# Patient Record
Sex: MALE | Race: BLACK OR AFRICAN AMERICAN
[De-identification: names, ages, dates, MRNs, and addresses within clinical notes are randomized per-mention and may not be internally consistent; named-entity substitution may affect disease eponyms.]

---

## 2019-07-03 ENCOUNTER — HOSPITAL ENCOUNTER (EMERGENCY)
Dept: HOSPITAL 4 - SED | Age: 29
Discharge: HOME | End: 2019-07-03
Payer: SELF-PAY

## 2019-07-03 VITALS — BODY MASS INDEX: 25.77 KG/M2 | WEIGHT: 180 LBS | HEIGHT: 70 IN

## 2019-07-03 VITALS — SYSTOLIC BLOOD PRESSURE: 133 MMHG

## 2019-07-03 VITALS — SYSTOLIC BLOOD PRESSURE: 128 MMHG

## 2019-07-03 DIAGNOSIS — L03.211: ICD-10-CM

## 2019-07-03 DIAGNOSIS — K02.9: Primary | ICD-10-CM

## 2019-07-03 NOTE — NUR
C/O right lower molar pain x 3 days, reports that he has lost his insurance and 
has been unable to see a dentist. Patient denies fever or radiation of pain. 
Patient is able to open his jaw to talk, however he has increased pain with 
chewing of food.

## 2019-07-03 NOTE — NUR
Patient given written and verbal discharge instructions and verbalizes 
understanding.  ER MD discussed with patient the results and treatment 
provided. Patient in stable condition. ID arm band removed.

Rx of keflex, motrin given. Patient educated on pain management and to follow 
up with PMD. Pain Scale 8/10, MD is aware, patient medicated.

Opportunity for questions provided and answered. Medication side effect fact 
sheet provided.